# Patient Record
Sex: FEMALE | Race: BLACK OR AFRICAN AMERICAN | NOT HISPANIC OR LATINO | Employment: OTHER | URBAN - METROPOLITAN AREA
[De-identification: names, ages, dates, MRNs, and addresses within clinical notes are randomized per-mention and may not be internally consistent; named-entity substitution may affect disease eponyms.]

---

## 2017-01-22 ENCOUNTER — HOSPITAL ENCOUNTER (EMERGENCY)
Facility: HOSPITAL | Age: 47
Discharge: HOME | End: 2017-01-22
Attending: EMERGENCY MEDICINE
Payer: COMMERCIAL

## 2017-01-22 VITALS
DIASTOLIC BLOOD PRESSURE: 67 MMHG | SYSTOLIC BLOOD PRESSURE: 145 MMHG | OXYGEN SATURATION: 99 % | HEIGHT: 66 IN | HEART RATE: 60 BPM | RESPIRATION RATE: 18 BRPM | TEMPERATURE: 99 F | WEIGHT: 140 LBS | BODY MASS INDEX: 22.5 KG/M2

## 2017-01-22 DIAGNOSIS — R29.810 WEAKNESS OF FACIAL MUSCLE AFFECTING CLOSURE OF EYE: ICD-10-CM

## 2017-01-22 DIAGNOSIS — G51.0 BELL'S PALSY: ICD-10-CM

## 2017-01-22 DIAGNOSIS — H92.02 LEFT EAR PAIN: ICD-10-CM

## 2017-01-22 DIAGNOSIS — R29.810 FACIAL WEAKNESS: Primary | ICD-10-CM

## 2017-01-22 DIAGNOSIS — R29.810 MOUTH DROOP DUE TO FACIAL WEAKNESS: ICD-10-CM

## 2017-01-22 LAB
ALBUMIN SERPL BCP-MCNC: 3.5 G/DL
ALP SERPL-CCNC: 51 U/L
ALT SERPL W/O P-5'-P-CCNC: 10 U/L
AMPHET+METHAMPHET UR QL: NEGATIVE
ANION GAP SERPL CALC-SCNC: 4 MMOL/L
APTT BLDCRRT: 25.2 SEC
AST SERPL-CCNC: 14 U/L
B-HCG UR QL: NEGATIVE
BARBITURATES UR QL SCN>200 NG/ML: NEGATIVE
BASOPHILS # BLD AUTO: 0.01 K/UL
BASOPHILS NFR BLD: 0.2 %
BENZODIAZ UR QL SCN>200 NG/ML: NEGATIVE
BILIRUB SERPL-MCNC: 0.4 MG/DL
BILIRUB UR QL STRIP: NEGATIVE
BNP SERPL-MCNC: 32 PG/ML
BUN SERPL-MCNC: 10 MG/DL
BZE UR QL SCN: NEGATIVE
CALCIUM SERPL-MCNC: 8.6 MG/DL
CANNABINOIDS UR QL SCN: NEGATIVE
CHLORIDE SERPL-SCNC: 105 MMOL/L
CLARITY UR: CLEAR
CO2 SERPL-SCNC: 29 MMOL/L
COLOR UR: YELLOW
CREAT SERPL-MCNC: 0.8 MG/DL
CREAT UR-MCNC: 116.8 MG/DL
CTP QC/QA: YES
DIFFERENTIAL METHOD: ABNORMAL
EOSINOPHIL # BLD AUTO: 0.1 K/UL
EOSINOPHIL NFR BLD: 2 %
ERYTHROCYTE [DISTWIDTH] IN BLOOD BY AUTOMATED COUNT: 13.5 %
EST. GFR  (AFRICAN AMERICAN): >60 ML/MIN/1.73 M^2
EST. GFR  (NON AFRICAN AMERICAN): >60 ML/MIN/1.73 M^2
GLUCOSE SERPL-MCNC: 84 MG/DL
GLUCOSE UR QL STRIP: NEGATIVE
HCT VFR BLD AUTO: 33.9 %
HGB BLD-MCNC: 11.7 G/DL
HGB UR QL STRIP: NEGATIVE
INR PPP: 1.1
KETONES UR QL STRIP: NEGATIVE
LEUKOCYTE ESTERASE UR QL STRIP: NEGATIVE
LYMPHOCYTES # BLD AUTO: 1.8 K/UL
LYMPHOCYTES NFR BLD: 36.5 %
MCH RBC QN AUTO: 31 PG
MCHC RBC AUTO-ENTMCNC: 34.5 %
MCV RBC AUTO: 90 FL
METHADONE UR QL SCN>300 NG/ML: NEGATIVE
MONOCYTES # BLD AUTO: 0.4 K/UL
MONOCYTES NFR BLD: 7.6 %
NEUTROPHILS # BLD AUTO: 2.7 K/UL
NEUTROPHILS NFR BLD: 53.7 %
NITRITE UR QL STRIP: NEGATIVE
OPIATES UR QL SCN: NEGATIVE
PCP UR QL SCN>25 NG/ML: NEGATIVE
PH UR STRIP: 5 [PH] (ref 5–8)
PLATELET # BLD AUTO: 208 K/UL
PMV BLD AUTO: 10.7 FL
POTASSIUM SERPL-SCNC: 3.8 MMOL/L
PROT SERPL-MCNC: 7.1 G/DL
PROT UR QL STRIP: NEGATIVE
PROTHROMBIN TIME: 11.2 SEC
RBC # BLD AUTO: 3.77 M/UL
SODIUM SERPL-SCNC: 138 MMOL/L
SP GR UR STRIP: 1.02 (ref 1–1.03)
TOXICOLOGY INFORMATION: NORMAL
TROPONIN I SERPL DL<=0.01 NG/ML-MCNC: <0.006 NG/ML
URN SPEC COLLECT METH UR: NORMAL
UROBILINOGEN UR STRIP-ACNC: NEGATIVE EU/DL
WBC # BLD AUTO: 5.01 K/UL

## 2017-01-22 PROCEDURE — 82570 ASSAY OF URINE CREATININE: CPT

## 2017-01-22 PROCEDURE — 85730 THROMBOPLASTIN TIME PARTIAL: CPT

## 2017-01-22 PROCEDURE — 85610 PROTHROMBIN TIME: CPT

## 2017-01-22 PROCEDURE — 83880 ASSAY OF NATRIURETIC PEPTIDE: CPT

## 2017-01-22 PROCEDURE — 25000003 PHARM REV CODE 250: Performed by: EMERGENCY MEDICINE

## 2017-01-22 PROCEDURE — 85025 COMPLETE CBC W/AUTO DIFF WBC: CPT

## 2017-01-22 PROCEDURE — 99284 EMERGENCY DEPT VISIT MOD MDM: CPT

## 2017-01-22 PROCEDURE — 84484 ASSAY OF TROPONIN QUANT: CPT

## 2017-01-22 PROCEDURE — 80053 COMPREHEN METABOLIC PANEL: CPT

## 2017-01-22 PROCEDURE — 81003 URINALYSIS AUTO W/O SCOPE: CPT

## 2017-01-22 PROCEDURE — 81025 URINE PREGNANCY TEST: CPT | Performed by: EMERGENCY MEDICINE

## 2017-01-22 PROCEDURE — 63600175 PHARM REV CODE 636 W HCPCS: Performed by: EMERGENCY MEDICINE

## 2017-01-22 PROCEDURE — 93005 ELECTROCARDIOGRAM TRACING: CPT

## 2017-01-22 RX ORDER — PREDNISONE 20 MG/1
60 TABLET ORAL
Status: COMPLETED | OUTPATIENT
Start: 2017-01-22 | End: 2017-01-22

## 2017-01-22 RX ORDER — PREDNISONE 10 MG/1
TABLET ORAL
Qty: 45 TABLET | Refills: 0 | Status: SHIPPED | OUTPATIENT
Start: 2017-01-22

## 2017-01-22 RX ORDER — VALACYCLOVIR HYDROCHLORIDE 500 MG/1
1000 TABLET, FILM COATED ORAL
Status: COMPLETED | OUTPATIENT
Start: 2017-01-22 | End: 2017-01-22

## 2017-01-22 RX ORDER — VALACYCLOVIR HYDROCHLORIDE 1 G/1
1000 TABLET, FILM COATED ORAL 3 TIMES DAILY
Qty: 21 TABLET | Refills: 0 | Status: SHIPPED | OUTPATIENT
Start: 2017-01-22 | End: 2017-01-29

## 2017-01-22 RX ADMIN — PREDNISONE 60 MG: 20 TABLET ORAL at 06:01

## 2017-01-22 RX ADMIN — VALACYCLOVIR HYDROCHLORIDE 1000 MG: 500 TABLET, FILM COATED ORAL at 06:01

## 2017-01-22 NOTE — ED TRIAGE NOTES
Pt arrived to ED from home with c/o numbness to left side of the face and eye.  Pt states she had ear ache in her left ear two days ago and thinks the numbness might be causes by the ear ache.  Pt states she does not have a sore throat, but feels as though her left side of her cheek and mouth are sore and numb.  Pt states she still has some ear ache and pt has slightly swollen left sided lymph nodes upon palpitation.

## 2017-01-22 NOTE — ED AVS SNAPSHOT
[facility]  [place] [address]               Kayley Paulino   2017  4:54 PM   ED    Description:  Female : 1970   Department:  [facility]           Your Care was Coordinated By:     Provider Role From To    Mariam Burt MD Attending Provider [id number] --      Reason for Visit     Numbness           Diagnoses this Visit        Comments    Facial weakness    -  Primary     Weakness of facial muscle affecting closure of eye         Mouth droop due to facial weakness         Bell's palsy         Left ear pain           ED Disposition     ED Disposition Condition Comment    Discharge             To Do List           Follow-up Information     Follow up with your primary care physician In 1 week.    Why:  for reassessment       These Medications        Disp Refills Start End    predniSONE (DELTASONE) 10 MG tablet 45 tablet 0 2017     6 tablets/day x 5 days, then 5 tablets/day x 1 day, then 4 tablets/day x 1 day, then 3 tablets/day x 1 day, then 2 tablets/day x 1 day, then 1 tablet/day x 1 day.    Pharmacy: [facility] [employer] [employer] [address] [place] AT [facility] Ph #: [phone]       valacyclovir (VALTREX) 1000 MG tablet 21 tablet 0 2017    Take 1 tablet (1,000 mg total) by mouth 3 (three) times daily. - Oral    Pharmacy: [address] [place] AT [facility] Ph #: [phone]       dextran 70-hypromellose (TEARS) ophthalmic solution 15 mL 0 2017     Place 1 drop into the left eye as needed. - Left Eye    Pharmacy: [address] [place] AT [facility] Ph #: [phone]       artificial tears w/ lanolin (AKWA TEARS) 0.5% ophthalmic ointment 3.5 g 0 2017     Apply to left eye before bed.    Pharmacy: [facility] [employer] [address] AT [facility]  [facility] #: [phone]         [facility]     [facility] -  Assistance  Registered nurses in the [facility] provide clinical advisement, health education, appointment booking, and other advisory services.  Call for this free service at [phone].             Medications           START taking these NEW medications        Refills    predniSONE (DELTASONE) 10 MG tablet 0    Si tablets/day x 5 days, then 5 tablets/day x 1 day, then 4 tablets/day x 1 day, then 3 tablets/day x 1 day, then 2 tablets/day x 1 day, then 1 tablet/day x 1 day.    Class: Print    valacyclovir (VALTREX) 1000 MG tablet 0    Sig: Take 1 tablet (1,000 mg total) by mouth 3 (three) times daily.    Class: Print    Route: Oral    dextran 70-hypromellose (TEARS) ophthalmic solution 0    Sig: Place 1 drop into the left eye as needed.    Class: Print    Route: Left Eye    artificial tears w/ lanolin (AKWA TEARS) 0.5% ophthalmic ointment 0    Sig: Apply to left eye before bed.    Class: Print      These medications were administered today        Dose Freq    predniSONE tablet 60 mg 60 mg ED 1 Time    Sig: Take 3 tablets (60 mg total) by mouth ED 1 Time.    Class: Normal    Route: Oral    valacyclovir tablet 1,000 mg 1,000 mg ED 1 Time    Sig: Take 2 tablets (1,000 mg total) by mouth ED 1 Time.    Class: Normal    Route: Oral      STOP taking these medications     ibuprofen (ADVIL,MOTRIN) 600 MG tablet Take 1 tablet (600 mg total) by mouth every 6 (six) hours as needed for Pain.           Verify that the below list of medications is an accurate representation of the medications you are currently taking.  If none reported, the list may be blank. If incorrect, please contact your healthcare provider. Carry this list with you in case of emergency.           Current Medications     artificial tears w/ lanolin (AKWA TEARS) 0.5% ophthalmic ointment Apply to left eye before bed.    dextran  "70-hypromellose (TEARS) ophthalmic solution Place 1 drop into the left eye as needed.    predniSONE (DELTASONE) 10 MG tablet 6 tablets/day x 5 days, then 5 tablets/day x 1 day, then 4 tablets/day x 1 day, then 3 tablets/day x 1 day, then 2 tablets/day x 1 day, then 1 tablet/day x 1 day.    predniSONE tablet 60 mg Take 3 tablets (60 mg total) by mouth ED 1 Time.    valacyclovir (VALTREX) 1000 MG tablet Take 1 tablet (1,000 mg total) by mouth 3 (three) times daily.    valacyclovir tablet 1,000 mg Take 2 tablets (1,000 mg total) by mouth ED 1 Time.           Clinical Reference Information           Your Vitals Were     BP Pulse Temp Resp Height Weight    145/67 60 99 °F (37.2 °C) (Oral) 18 5' 6" (1.676 m) 63.5 kg (140 lb)    Last Period SpO2 BMI          01/10/2017 99% 22.6 kg/m2        Allergies as of 1/22/2017     No Known Allergies      Immunizations Administered on Date of Encounter - 1/22/2017     None      ED Micro, Lab, POCT     Start Ordered       Status Ordering Provider    01/22/17 1541 01/22/17 1540  POCT urine pregnancy  Once      Final result     01/22/17 1520 01/22/17 1519  Drug screen panel, emergency  Once      Final result     01/22/17 1520 01/22/17 1519  Troponin I  STAT      Final result     01/22/17 1520 01/22/17 1519  Brain natriuretic peptide  STAT      Final result     01/22/17 1520 01/22/17 1519  Protime-INR  STAT      Final result     01/22/17 1520 01/22/17 1519  APTT  STAT      Final result     01/22/17 1519 01/22/17 1519  CBC auto differential  STAT      Final result     01/22/17 1519 01/22/17 1519  Comprehensive metabolic panel  STAT      Final result     01/22/17 1519 01/22/17 1519  Urinalysis  STAT      Final result       ED Imaging Orders     Start Ordered       Status Ordering Provider    01/22/17 1519 01/22/17 1519  CT Head Without Contrast  1 time imaging      Final result         Discharge Instructions         Bells Palsy  Bells palsy is a nerve disorder that usually happens " suddenly and without warning. This condition happens when a nerve that controls facial movement is damaged. Nerve damage can happen for many reasons. But most cases of Bells palsy are probably caused by a virus.  Symptoms of Bells palsy  Here are signs of the disorder:   · Mild weakness to total paralysis of one side of your face  · Drooping mouth, drooling on one side of mouth  · Trouble closing one eye  · Noises seeming louder than usual  · Change in your sense of taste  When to go to the emergency department (ED)  There are conditions, such as stroke, that may look like Bell's palsy and are medical emergencies. Therefore, you should seek emergent medical care if you notice facial weakness or drooping. Although Bells palsy can be alarming, its rarely serious. Many people begin to improve in about 2 weeks, even without treatment. It is important to be seen as soon as possible. Most research shows that treatment with beneficial results was received within the first few days of symptoms.   Treatment  To treat Bells palsy, you may be given steroid medicines. This helps reduce swelling of the affected nerve. In some cases, your healthcare provider may prescribe an antiviral medicine. Your open eye may be covered with a patch to prevent it from drying out. You also may need to use eye drops and ointments for a time. Your healthcare provider will discuss follow-up care with you, including the possible need for further treatment to help your facial muscles return to normal.  © 4191-5853 [employer]. [address] [place] [address], [place] [address]. All rights reserved. This information is not intended as a substitute for professional medical care. Always follow your healthcare professional's instructions.          Walkers Palsy    Bell's Palsy is a problem involving the nerve that controls the muscles on one side of the face.  The cause is unknown, but may be related to inflammation of the nerve. Symptoms  usually appear only on the affected side. They may include:  · Inability to close the eyelid  · Tearing of the eye  · Facial drooping  · Drooling  · Numbness or pain  · Changes in taste  · Sound sensitivity  Damage to the eye can be a serious problem. The inability to blink can cause the eye to dry out. An ulcer (sore) can then form on the cornea. Also, not blinking means that the eye has no protection from dirt and dust particles.   Treatment involves protecting and moistening the eye. Medicines may also help.  Most persons recover completely within 3 to 6 months. However, the condition sometimes returns months or years later.  Home care  · Get plenty of rest and eat a healthy diet to help yourself recover.  · Use Artificial Tears frequently during the day and at bedtime to prevent drying. These drops are available without prescription at your drug store.  · Wear protective glasses especially when outside to protect from flying debris. Use sunglasses when outdoors.  · Tape the eyelid closed at bedtime with a paper tape (available at your pharmacy). This has a very mild adhesive to avoid injury to the lid. This will protect your eye from injury while you sleep.  · Sometimes medicines are prescribed to reduce inflammation or treat specific viral infections of the nerve. If medicines are prescribed, take them exactly as directed.  Usually there is a 10-day course of medication that is started as soon as possible.  Taking this medicine as prescribed will help with a full recovery.    · Use low heat, for example from a heating pad, on the affected area.  This can help reduce pain and swelling.    · If you are experiencing sever pain, contact your health care provider.  Follow-up care  Follow up with your healthcare provider as advised. If you referred to a specialist, make that appointment promptly.  When to seek medical advice  Call your healthcare provider if any of the following occur:  · Severe eye redness  · Eye  pain  · Thick drainage from the eye  · Change in vision (such as double vision or losing vision)  · Fever over 100.4°F (38°C) or as directed by your healthcare provider  · Headache, neck pain, weakness, trouble speaking or walking, or other unexplained symptoms  © 8414-4601 [employer]. [address] [place] [address]. All rights reserved. This information is not intended as a substitute for professional medical care. Always follow your healthcare professional's instructions.          [facility] Sign-Up     Activating your [facility] account is as easy as 1-2-3!     1) Visit [email or web address], select Sign Up Now, enter this activation code and your date of birth, then select Next.  [email or web address]  Expires: 3/8/2017  5:48 PM      2) Create a username and password to use when you visit [facility] in the future and select a security question in case you lose your password and select Next.    3) Enter your e-mail address and click Sign Up!    Additional Information  If you have questions, please e-mail [email or web address] or call [phone] to talk to our [facility] staff. Remember, [facility] is NOT to be used for urgent needs. For medical emergencies, dial 911.          [facility] complies with applicable Federal civil rights laws and does not discriminate on the basis of race, color, national origin, age, disability, or sex.        Language Assistance Services     ATTENTION: Language assistance services are available, free of charge. Please call [phone].      ATENCIÓN: Si habla español, tiene a fuller disposición servicios gratuitos de asistencia lingüística. Llame al [phone].     CHÚ Ý: N?u b?n nói Ti?ng Vi?t, có các d?ch v? h? tr? ngôn ng? mi?n phí dành cho b?n. G?i s? [phone].

## 2017-01-22 NOTE — DISCHARGE INSTRUCTIONS
Bells Palsy  Bells palsy is a nerve disorder that usually happens suddenly and without warning. This condition happens when a nerve that controls facial movement is damaged. Nerve damage can happen for many reasons. But most cases of Bells palsy are probably caused by a virus.  Symptoms of Bells palsy  Here are signs of the disorder:   · Mild weakness to total paralysis of one side of your face  · Drooping mouth, drooling on one side of mouth  · Trouble closing one eye  · Noises seeming louder than usual  · Change in your sense of taste  When to go to the emergency department (ED)  There are conditions, such as stroke, that may look like Bell's palsy and are medical emergencies. Therefore, you should seek emergent medical care if you notice facial weakness or drooping. Although Bells palsy can be alarming, its rarely serious. Many people begin to improve in about 2 weeks, even without treatment. It is important to be seen as soon as possible. Most research shows that treatment with beneficial results was received within the first few days of symptoms.   Treatment  To treat Bells palsy, you may be given steroid medicines. This helps reduce swelling of the affected nerve. In some cases, your healthcare provider may prescribe an antiviral medicine. Your open eye may be covered with a patch to prevent it from drying out. You also may need to use eye drops and ointments for a time. Your healthcare provider will discuss follow-up care with you, including the possible need for further treatment to help your facial muscles return to normal.  © 8960-3885 [employer]. [address] [place] [address], [place] [address]. All rights reserved. This information is not intended as a substitute for professional medical care. Always follow your healthcare professional's instructions.          Walkers Palsy    Bell's Palsy is a problem involving the nerve that controls the muscles on one side of the face.  The cause is  unknown, but may be related to inflammation of the nerve. Symptoms usually appear only on the affected side. They may include:  · Inability to close the eyelid  · Tearing of the eye  · Facial drooping  · Drooling  · Numbness or pain  · Changes in taste  · Sound sensitivity  Damage to the eye can be a serious problem. The inability to blink can cause the eye to dry out. An ulcer (sore) can then form on the cornea. Also, not blinking means that the eye has no protection from dirt and dust particles.   Treatment involves protecting and moistening the eye. Medicines may also help.  Most persons recover completely within 3 to 6 months. However, the condition sometimes returns months or years later.  Home care  · Get plenty of rest and eat a healthy diet to help yourself recover.  · Use Artificial Tears frequently during the day and at bedtime to prevent drying. These drops are available without prescription at your drug store.  · Wear protective glasses especially when outside to protect from flying debris. Use sunglasses when outdoors.  · Tape the eyelid closed at bedtime with a paper tape (available at your pharmacy). This has a very mild adhesive to avoid injury to the lid. This will protect your eye from injury while you sleep.  · Sometimes medicines are prescribed to reduce inflammation or treat specific viral infections of the nerve. If medicines are prescribed, take them exactly as directed.  Usually there is a 10-day course of medication that is started as soon as possible.  Taking this medicine as prescribed will help with a full recovery.    · Use low heat, for example from a heating pad, on the affected area.  This can help reduce pain and swelling.    · If you are experiencing sever pain, contact your health care provider.  Follow-up care  Follow up with your healthcare provider as advised. If you referred to a specialist, make that appointment promptly.  When to seek medical advice  Call your healthcare  provider if any of the following occur:  · Severe eye redness  · Eye pain  · Thick drainage from the eye  · Change in vision (such as double vision or losing vision)  · Fever over 100.4°F (38°C) or as directed by your healthcare provider  · Headache, neck pain, weakness, trouble speaking or walking, or other unexplained symptoms  © 6783-1130 [employer]. [address] [place] [address]. All rights reserved. This information is not intended as a substitute for professional medical care. Always follow your healthcare professional's instructions.

## 2017-01-22 NOTE — ED PROVIDER NOTES
"Encounter Date: 1/22/2017    SCRIBE #1 NOTE: I, Lizett Stanford, am scribing for, and in the presence of,  Mariam Burt MD. I have scribed the following portions of the note - Other sections scribed: HPI, ROS.       History     Chief Complaint   Patient presents with    Numbness     "It started as an ear ache and now it feels like stroke symptoms." L side facial numbness since 0900 this morning.     Review of patient's allergies indicates:  No Known Allergies  HPI Comments: CC: Numbness    HPI: 46 y.o. F presents to the ED c/o acute onset left ear pain that began 3 days ago and new onset left-sided facial numbness with associated facial swelling and clear drainage to the left eye that began this morning. Pt states she thinks symptoms are from a possible ear infection or infection to the left side of her mouth. She states, "My smiling feels different." She reports hearing different noise levels to the left ear. Pt states her ear ache spontaneously resolved, however, she states she currently feels "tension" to the left ear. Symptoms to the left ear are exacerbated with palpation. She is currently not on any daily medications. Pt denies no recent sick contacts, recent illnesses, recent long-distance travel, hx of facial infections, appetite changes, fever, chills, eye pain, nausea, vomiting, and any other associated symptoms. No previous similar hx. No prior treatment. No alleviating factors.    Pt has PMHx of fibroids in which she states she needs to schedule an appointment with her doctor for removal. She reports she currently does not have a PCP. She states she recently received health insurance and did not assign a PCP.    The history is provided by the patient. No  was used.     History reviewed. No pertinent past medical history.  No past medical history pertinent negatives.  Past Surgical History   Procedure Laterality Date    Tummy tuck       Family History   Problem Relation Age of Onset " "   Breast cancer Neg Hx     Colon cancer Neg Hx     Ovarian cancer Neg Hx      Social History   Substance Use Topics    Smoking status: Never Smoker    Smokeless tobacco: None    Alcohol use Yes      Comment: socially     Review of Systems   Constitutional: Negative for appetite change, chills, diaphoresis and fever.   HENT: Positive for ear pain (left ear "tension"), facial swelling (left-sided) and hearing loss (hearing different noise levels to the left ear). Negative for sore throat and tinnitus.    Eyes: Positive for discharge (clear drainage to the left eye). Negative for pain.   Respiratory: Negative for cough and shortness of breath.    Cardiovascular: Negative for chest pain.   Gastrointestinal: Negative for abdominal pain, diarrhea, nausea and vomiting.   Genitourinary: Negative for dysuria.   Musculoskeletal: Negative for back pain.   Skin: Negative for rash.   Neurological: Positive for numbness (left-sided facial). Negative for headaches.       Physical Exam   Initial Vitals   BP Pulse Resp Temp SpO2   01/22/17 1507 01/22/17 1507 01/22/17 1507 01/22/17 1507 01/22/17 1507   159/85 68 18 99 °F (37.2 °C) 98 %     Physical Exam    Nursing note and vitals reviewed.  Constitutional: She appears well-developed and well-nourished. She is not diaphoretic.  Non-toxic appearance. She does not appear ill. No distress.   Awake, alert, non-toxic appearing female.   HENT:   Head: Normocephalic and atraumatic.   Pt has wax to bilateral ear canals impeding visualization of TMs. No gross deformities. No mastoid tenderness. No obvious facial swelling. +obvious left-sided mouth droop. Poor dentition to the posterior upper left jaw but without intraoral lesions. Subjective decreased sensation to the left face.   Eyes: Conjunctivae and EOM are normal. Pupils are equal, round, and reactive to light.   Extraocular movements intact. Pt has decreased strength in L eye closing and delayed blink on L.   Neck: Normal range of " motion. Neck supple.   Cardiovascular: Normal rate, regular rhythm, normal heart sounds and intact distal pulses.   Pulmonary/Chest: Effort normal and breath sounds normal. No respiratory distress.   Abdominal: Soft. Normal appearance and bowel sounds are normal. She exhibits no distension. There is no tenderness.   Musculoskeletal: Normal range of motion. She exhibits no edema.   No bilateral lower extremity edema.   Lymphadenopathy:   No lymphadenopathy.   Neurological: She is alert and oriented to person, place, and time. She has normal strength. A cranial nerve deficit is present. No sensory deficit.   Biceps reflex are equal. Finger-nose test intact. Decreased L brow raise vs R, decreased L eye closing, decreased L facial sensation (subjective). Tongue is midline.    Skin: Skin is warm and dry. No rash noted.   Psychiatric: She has a normal mood and affect.         ED Course   Procedures  Labs Reviewed   CBC W/ AUTO DIFFERENTIAL - Abnormal; Notable for the following:        Result Value    RBC 3.77 (*)     Hemoglobin 11.7 (*)     Hematocrit 33.9 (*)     All other components within normal limits   COMPREHENSIVE METABOLIC PANEL - Abnormal; Notable for the following:     Calcium 8.6 (*)     Alkaline Phosphatase 51 (*)     Anion Gap 4 (*)     All other components within normal limits   URINALYSIS   DRUG SCREEN PANEL, URINE EMERGENCY   TROPONIN I   B-TYPE NATRIURETIC PEPTIDE   PROTIME-INR   APTT   POCT URINE PREGNANCY     EKG Readings: (Independently Interpreted)   NSR, HR 64. Normal axis and intervals. No STEMI.        X-Rays:   Independently Interpreted Readings:   Other Readings:  CT head NAD    Medical Decision Making:   History:   Old Medical Records: I decided to obtain old medical records.  Old Records Summarized: records from clinic visits.  Initial Assessment:   47 yo female without significant PMH presents with acute onset L ear pain and L facial weakness.   Differential Diagnosis:   Ddx includes CVA,  odontogenic abscess, mastoiditis, Bell's palsy, other.  Independently Interpreted Test(s):   I have ordered and independently interpreted X-rays - see prior notes.  I have ordered and independently interpreted EKG Reading(s) - see prior notes  Clinical Tests:   Lab Tests: Ordered and Reviewed  The following lab test(s) were unremarkable: CBC, CMP, Troponin, BNP, PT, Urinalysis and UPT  Radiological Study: Reviewed and Ordered  Medical Tests: Reviewed and Ordered  ED Management:  Head CT negative for CVA and labs overall reassuring. Exam shows obvious L facial weakness which does not spare the forehead. No swelling as reported by patient -- I suspect her face feels swollen due to the weakness she is experiencing but she has no evidence of swelling on exam. She has no erythema or intraoral lesions to suggest odontogenic abscess. I suspect the patient has a Bell's palsy, which would explain both the pain at onset of symptoms and her current weakness. She has not traveled to any [place]-endemic areas. She has no lesions to her ear and her L eye has minimal clear tearing (she cannot close it well) and no pain to the eye; I do not see evidence of Parvin Hardwick or ocular HSV. However, because of her pain, I will treat her both with valacyclovir (to cover HSV) and prednisone (for her acute Bell's). I have provided her copies of her labs and imaging report and instructed her on the importance of PMD f/u (patient has no PMD so I referred her). I have also educated patient/ re: Bell's palsy and importance of eye care (eg taping eye shut at night and using artificial tears) until facial weakness resolves and have rx'ed artificial tears to use for this purpose. Return precautions discussed.            Scribe Attestation:   Scribe #1: I performed the above scribed service and the documentation accurately describes the services I performed. I attest to the accuracy of the note.    Attending Attestation:           Physician  Attestation for Scribe:  Physician Attestation Statement for Scribe #1: I, Mariam Burt MD, reviewed documentation, as scribed by Lizett Stanford in my presence, and it is both accurate and complete.                 ED Course     Clinical Impression:   The primary encounter diagnosis was Facial weakness. Diagnoses of Weakness of facial muscle affecting closure of eye, Mouth droop due to facial weakness, Bell's palsy, and Left ear pain were also pertinent to this visit.          Mariam Burt MD  01/27/17 [id number]

## 2021-04-07 ENCOUNTER — IMMUNIZATION (OUTPATIENT)
Dept: PRIMARY CARE CLINIC | Facility: CLINIC | Age: 51
End: 2021-04-07

## 2021-04-07 DIAGNOSIS — Z23 NEED FOR VACCINATION: Primary | ICD-10-CM

## 2021-04-07 PROCEDURE — 0001A COVID-19, MRNA, LNP-S, PF, 30 MCG/0.3 ML DOSE VACCINE: CPT | Mod: CV19,S$GLB,, | Performed by: INTERNAL MEDICINE

## 2021-04-07 PROCEDURE — 91300 COVID-19, MRNA, LNP-S, PF, 30 MCG/0.3 ML DOSE VACCINE: ICD-10-PCS | Mod: S$GLB,,, | Performed by: INTERNAL MEDICINE

## 2021-04-07 PROCEDURE — 91300 COVID-19, MRNA, LNP-S, PF, 30 MCG/0.3 ML DOSE VACCINE: CPT | Mod: S$GLB,,, | Performed by: INTERNAL MEDICINE

## 2021-04-07 PROCEDURE — 0001A COVID-19, MRNA, LNP-S, PF, 30 MCG/0.3 ML DOSE VACCINE: ICD-10-PCS | Mod: CV19,S$GLB,, | Performed by: INTERNAL MEDICINE

## 2021-04-28 ENCOUNTER — IMMUNIZATION (OUTPATIENT)
Dept: PRIMARY CARE CLINIC | Facility: CLINIC | Age: 51
End: 2021-04-28
Payer: COMMERCIAL

## 2021-04-28 DIAGNOSIS — Z23 NEED FOR VACCINATION: Primary | ICD-10-CM

## 2021-04-28 PROCEDURE — 0002A COVID-19, MRNA, LNP-S, PF, 30 MCG/0.3 ML DOSE VACCINE: CPT | Mod: CV19,S$GLB,, | Performed by: INTERNAL MEDICINE

## 2021-04-28 PROCEDURE — 91300 COVID-19, MRNA, LNP-S, PF, 30 MCG/0.3 ML DOSE VACCINE: CPT | Mod: S$GLB,,, | Performed by: INTERNAL MEDICINE

## 2021-04-28 PROCEDURE — 0002A COVID-19, MRNA, LNP-S, PF, 30 MCG/0.3 ML DOSE VACCINE: ICD-10-PCS | Mod: CV19,S$GLB,, | Performed by: INTERNAL MEDICINE

## 2021-04-28 PROCEDURE — 91300 COVID-19, MRNA, LNP-S, PF, 30 MCG/0.3 ML DOSE VACCINE: ICD-10-PCS | Mod: S$GLB,,, | Performed by: INTERNAL MEDICINE
